# Patient Record
Sex: MALE | Race: WHITE | NOT HISPANIC OR LATINO | Employment: OTHER | URBAN - NONMETROPOLITAN AREA
[De-identification: names, ages, dates, MRNs, and addresses within clinical notes are randomized per-mention and may not be internally consistent; named-entity substitution may affect disease eponyms.]

---

## 2023-09-17 PROBLEM — E11.9 TYPE 2 DIABETES MELLITUS WITHOUT COMPLICATION (MULTI): Status: ACTIVE | Noted: 2023-09-17

## 2023-09-17 PROBLEM — I10 HIGH BLOOD PRESSURE: Status: ACTIVE | Noted: 2023-09-17

## 2023-09-17 PROBLEM — J45.909 ASTHMA (HHS-HCC): Status: ACTIVE | Noted: 2023-09-17

## 2023-09-17 RX ORDER — METFORMIN HYDROCHLORIDE 500 MG/1
500 TABLET ORAL
COMMUNITY
End: 2024-01-08

## 2023-09-17 RX ORDER — THEOPHYLLINE 400 MG/1
400 TABLET, EXTENDED RELEASE ORAL DAILY
COMMUNITY

## 2023-09-17 RX ORDER — BUDESONIDE AND FORMOTEROL FUMARATE DIHYDRATE 80; 4.5 UG/1; UG/1
2 AEROSOL RESPIRATORY (INHALATION)
COMMUNITY
End: 2023-11-09 | Stop reason: SDUPTHER

## 2023-09-17 RX ORDER — ALBUTEROL SULFATE 90 UG/1
2 AEROSOL, METERED RESPIRATORY (INHALATION) EVERY 4 HOURS PRN
COMMUNITY
End: 2023-11-09 | Stop reason: SDUPTHER

## 2023-09-17 RX ORDER — BLOOD SUGAR DIAGNOSTIC
STRIP MISCELLANEOUS DAILY
COMMUNITY
End: 2023-11-09 | Stop reason: SDUPTHER

## 2023-09-17 RX ORDER — MAGNESIUM 200 MG
2 TABLET ORAL DAILY
COMMUNITY

## 2023-10-09 ENCOUNTER — OFFICE VISIT (OUTPATIENT)
Dept: PRIMARY CARE | Facility: CLINIC | Age: 73
End: 2023-10-09
Payer: MEDICARE

## 2023-10-09 VITALS
WEIGHT: 193.6 LBS | DIASTOLIC BLOOD PRESSURE: 70 MMHG | BODY MASS INDEX: 29.34 KG/M2 | HEART RATE: 89 BPM | SYSTOLIC BLOOD PRESSURE: 128 MMHG | HEIGHT: 68 IN

## 2023-10-09 DIAGNOSIS — J45.20 MILD INTERMITTENT ASTHMA WITHOUT COMPLICATION (HHS-HCC): ICD-10-CM

## 2023-10-09 DIAGNOSIS — I10 PRIMARY HYPERTENSION: Primary | ICD-10-CM

## 2023-10-09 DIAGNOSIS — Z12.11 COLON CANCER SCREENING: ICD-10-CM

## 2023-10-09 DIAGNOSIS — E11.9 TYPE 2 DIABETES MELLITUS TREATED WITHOUT INSULIN (MULTI): ICD-10-CM

## 2023-10-09 DIAGNOSIS — R06.02 SHORTNESS OF BREATH: ICD-10-CM

## 2023-10-09 PROCEDURE — 3074F SYST BP LT 130 MM HG: CPT | Performed by: FAMILY MEDICINE

## 2023-10-09 PROCEDURE — 1159F MED LIST DOCD IN RCRD: CPT | Performed by: FAMILY MEDICINE

## 2023-10-09 PROCEDURE — 1160F RVW MEDS BY RX/DR IN RCRD: CPT | Performed by: FAMILY MEDICINE

## 2023-10-09 PROCEDURE — 99214 OFFICE O/P EST MOD 30 MIN: CPT | Performed by: FAMILY MEDICINE

## 2023-10-09 PROCEDURE — 3078F DIAST BP <80 MM HG: CPT | Performed by: FAMILY MEDICINE

## 2023-10-09 PROCEDURE — 1126F AMNT PAIN NOTED NONE PRSNT: CPT | Performed by: FAMILY MEDICINE

## 2023-10-09 PROCEDURE — 1036F TOBACCO NON-USER: CPT | Performed by: FAMILY MEDICINE

## 2023-10-09 ASSESSMENT — ENCOUNTER SYMPTOMS
RECTAL PAIN: 0
DIARRHEA: 0
BLOOD IN STOOL: 0
PALPITATIONS: 0
FATIGUE: 0
CONSTIPATION: 0
ACTIVITY CHANGE: 0
TREMORS: 0
WEAKNESS: 0
SHORTNESS OF BREATH: 1
DYSPHORIC MOOD: 1
DIZZINESS: 0
APPETITE CHANGE: 0
CHEST TIGHTNESS: 0

## 2023-10-09 ASSESSMENT — PAIN SCALES - GENERAL: PAINLEVEL: 0-NO PAIN

## 2023-10-09 NOTE — PATIENT INSTRUCTIONS
For the diabetes, will have Karen, our pharmacist, set you up with a freestyle azalia monitor for the next two weeks. That will allow you to see how your blood sugar reacts to food. WE can download that in two weeks and see how you do.    For the shortness of breath, this may be asthma. Use the albuterol before your walking and let me know how you respond. If that resolves it, we can increase the symbicort dose. In the meantime, do the calcium scoring test.     BP is well controlled.   Since you are low risk for colon cancer, you may do the cologuard test.

## 2023-10-09 NOTE — PROGRESS NOTES
"Subjective   Patient ID: MARK M Dressler is a 73 y.o. male who presents for Diabetes (A1C Check).    Duglasmanuela today for check on his diabetes.  I reviewed his hemoglobin A1c with him.  He surprised that is at high because he has been checking his blood sugar and his does not think its been that high.  He does not have any polyuria or polydipsia.    When asked about his asthma he states pretty well controlled but he does get short of breath when he is walking longer distances.  He does not report any wheezing.  He does use the Symbicort twice a day.  He is never try to use the albuterol for the shortness of breath.    He states he did do the Cologuard test last year.  He does not have a family history of colon cancer.    Blood pressure reviewed and is good today.         Review of Systems   Constitutional:  Negative for activity change, appetite change and fatigue.   Respiratory:  Positive for shortness of breath (See HPI). Negative for chest tightness.    Cardiovascular:  Negative for chest pain, palpitations and leg swelling.   Gastrointestinal:  Negative for blood in stool, constipation, diarrhea and rectal pain.   Neurological:  Negative for dizziness, tremors and weakness.   Psychiatric/Behavioral:  Positive for dysphoric mood (States for little while he just felt a bit off and thought maybe he was a bit depressed.  However, he states he has come out of this now.).        Objective   /70   Pulse 89   Ht 1.734 m (5' 8.25\")   Wt 87.8 kg (193 lb 9.6 oz)   BMI 29.22 kg/m²     Physical Exam  Constitutional:       Appearance: Normal appearance.   Neck:      Thyroid: No thyromegaly or thyroid tenderness.      Vascular: No carotid bruit.   Cardiovascular:      Rate and Rhythm: Normal rate and regular rhythm.      Heart sounds: No murmur heard.  Pulmonary:      Effort: Pulmonary effort is normal.      Breath sounds: Normal breath sounds.   Musculoskeletal:      Cervical back: Neck supple.   Neurological:      " Mental Status: He is alert.   Psychiatric:         Mood and Affect: Mood normal.         Assessment/Plan   For the diabetes, will have Karen, our pharmacist, set you up with a freestyle azalia monitor for the next two weeks. That will allow you to see how your blood sugar reacts to food. WE can download that in two weeks and see how you do.    For the shortness of breath, this may be asthma. Use the albuterol before your walking and let me know how you respond. If that resolves it, we can increase the symbicort dose. In the meantime, do the calcium scoring test.     BP is well controlled.   Since you are low risk for colon cancer, you may do the cologuard test.     Diagnoses and all orders for this visit:  Primary hypertension  Type 2 diabetes mellitus treated without insulin (CMS/[facility])  -     POCT GLYCOSYLATED HEMOGLOBIN (HGB A1C)  Mild intermittent asthma without complication

## 2023-10-23 ENCOUNTER — CLINICAL SUPPORT (OUTPATIENT)
Dept: PRIMARY CARE | Facility: CLINIC | Age: 73
End: 2023-10-23
Payer: MEDICARE

## 2023-10-23 DIAGNOSIS — E11.9 TYPE 2 DIABETES MELLITUS WITHOUT COMPLICATION, WITHOUT LONG-TERM CURRENT USE OF INSULIN (MULTI): Primary | ICD-10-CM

## 2023-10-23 NOTE — PROGRESS NOTES
Subjective   Patient ID: MARK M Dressler is a 73 y.o. male who presents for 2 week CGM review.    Referring Provider: Rc Fu MD     HPI  Pt has been wearing Jenna 3 CGM sensor x 2 weeks - presents for download of Jenna data. Pt currently only taking metformin 500mg daily with lunch - would ideally like to stop medication completely and manage diabetes with diet and lifestyle modifications. Reports he has been changing his diet since starting Jenna 3, now holding himself more accountable since his last visit where his A1c was elevated at 8.1%. Pt also reports he needs an A1c of 7% or lower in order to continue donating plasma.   14 days of Jenna 3 data - 83% in target, 17% above, avg glucose 151, patterns: low to mid 100s on waking, spike noted after breakfast/lunch meal.     Objective     Labs  Lab Results   Component Value Date    BILITOT 0.6 03/06/2023    CALCIUM 9.7 03/06/2023    CO2 26 03/06/2023     03/06/2023    CREATININE 0.9 03/06/2023    GLUCOSE 167 (H) 03/06/2023    ALKPHOS 63 03/06/2023    K 4.3 03/06/2023    PROT 6.3 03/06/2023     03/06/2023    AST 20 03/06/2023    ALT 22 03/06/2023    BUN 11 03/06/2023    ANIONGAP 13 03/06/2023    ALBUMIN 3.9 03/06/2023     Lab Results   Component Value Date    TRIG 241 (H) 03/06/2023    CHOL 191 03/06/2023    LDLCALC 101 03/06/2023    HDL 42 03/06/2023       Current Outpatient Medications on File Prior to Visit   Medication Sig Dispense Refill    albuterol 90 mcg/actuation inhaler Inhale 2 puffs every 4 hours if needed.      budesonide-formoteroL (Symbicort) 80-4.5 mcg/actuation inhaler Inhale 2 puffs 2 times a day.      magnesium 200 mg tablet Take 2 tablets (400 mg) by mouth once daily. with a meal      metFORMIN (Glucophage) 500 mg tablet Take 1 tablet (500 mg) by mouth once daily at noon. Take with meals.      OneTouch Ultra Test strip once daily. TEST      theophylline ER (Uniphyl) 400 mg 24 hr tablet Take 1 tablet (400 mg) by mouth once  daily.      ZINC ACETATE ORAL Take 1 capsule by mouth once daily.      [DISCONTINUED] ascorbic acid (VITAMIN C ORAL) Take by mouth.       No current facility-administered medications on file prior to visit.      No Known Allergies    Assessment/Plan   Problem List Items Addressed This Visit       Type 2 diabetes mellitus without complication (CMS/[facility]) - Primary   Continue using Jenna 3 CGM to monitor sugars daily and learn which foods cause higher sugars.   Continue to follow a carb controlled diet - incorporate more protein into meals to slow how quickly sugar is spiking and how high it is spiking.   Continue increasing activity levels - exercising at least 30 minutes per day.   Continue taking metformin 500mg daily.     Treatment and plan discussed with Dr. Fu. JESSIE Reyna, PharmD, BCACP, CDCES.

## 2023-10-24 ENCOUNTER — HOSPITAL ENCOUNTER (OUTPATIENT)
Dept: RADIOLOGY | Facility: HOSPITAL | Age: 73
Discharge: HOME | End: 2023-10-24
Payer: MEDICARE

## 2023-10-24 DIAGNOSIS — R06.02 SHORTNESS OF BREATH: ICD-10-CM

## 2023-10-24 PROCEDURE — 75571 CT HRT W/O DYE W/CA TEST: CPT | Mod: MG

## 2023-11-06 ENCOUNTER — CLINICAL SUPPORT (OUTPATIENT)
Dept: PRIMARY CARE | Facility: CLINIC | Age: 73
End: 2023-11-06
Payer: MEDICARE

## 2023-11-06 VITALS
HEART RATE: 76 BPM | HEIGHT: 68 IN | SYSTOLIC BLOOD PRESSURE: 144 MMHG | TEMPERATURE: 98.2 F | OXYGEN SATURATION: 95 % | DIASTOLIC BLOOD PRESSURE: 82 MMHG | BODY MASS INDEX: 29.4 KG/M2 | WEIGHT: 194 LBS

## 2023-11-06 DIAGNOSIS — E11.9 TYPE 2 DIABETES MELLITUS WITHOUT COMPLICATION, WITHOUT LONG-TERM CURRENT USE OF INSULIN (MULTI): Primary | ICD-10-CM

## 2023-11-06 DIAGNOSIS — I10 HYPERTENSION, UNSPECIFIED TYPE: ICD-10-CM

## 2023-11-06 DIAGNOSIS — J45.20 MILD INTERMITTENT ASTHMA WITHOUT COMPLICATION (HHS-HCC): ICD-10-CM

## 2023-11-06 LAB — POC HEMOGLOBIN A1C: 7 % (ref 4.2–6.5)

## 2023-11-06 PROCEDURE — 99213 OFFICE O/P EST LOW 20 MIN: CPT | Performed by: PHARMACIST

## 2023-11-06 PROCEDURE — 99213 OFFICE O/P EST LOW 20 MIN: CPT | Performed by: FAMILY MEDICINE

## 2023-11-06 PROCEDURE — 95251 CONT GLUC MNTR ANALYSIS I&R: CPT | Performed by: FAMILY MEDICINE

## 2023-11-06 RX ORDER — ERGOCALCIFEROL (VITAMIN D2) 200 MCG/ML
1 DROPS ORAL
COMMUNITY

## 2023-11-06 RX ORDER — ALBUTEROL SULFATE 90 UG/1
2 AEROSOL, METERED RESPIRATORY (INHALATION) EVERY 4 HOURS PRN
Qty: 54 G | Refills: 1 | Status: CANCELLED | OUTPATIENT
Start: 2023-11-06

## 2023-11-06 RX ORDER — BUDESONIDE AND FORMOTEROL FUMARATE DIHYDRATE 80; 4.5 UG/1; UG/1
2 AEROSOL RESPIRATORY (INHALATION)
Qty: 3 EACH | Refills: 1 | Status: CANCELLED | OUTPATIENT
Start: 2023-11-06

## 2023-11-06 RX ORDER — BLOOD SUGAR DIAGNOSTIC
1 STRIP MISCELLANEOUS DAILY
Qty: 100 STRIP | Refills: 1 | Status: CANCELLED | OUTPATIENT
Start: 2023-11-06

## 2023-11-06 ASSESSMENT — PAIN SCALES - GENERAL: PAINLEVEL: 0-NO PAIN

## 2023-11-06 ASSESSMENT — PATIENT HEALTH QUESTIONNAIRE - PHQ9
SUM OF ALL RESPONSES TO PHQ9 QUESTIONS 1 & 2: 0
1. LITTLE INTEREST OR PLEASURE IN DOING THINGS: NOT AT ALL
2. FEELING DOWN, DEPRESSED OR HOPELESS: NOT AT ALL

## 2023-11-06 NOTE — PATIENT INSTRUCTIONS
Your A1c was 7% today.     Continue taking metformin 500 mg once daily.     Continue to follow a more carb controlled diet as you have been this past month and staying active day to day.     Start checking your blood pressure at home a few times per week using a home blood pressure cuff.   - Keep a log of your readings and bring to your next visit.   - If your blood pressure readings are consistently over 140/80, call the office.     Restart the herbs for digestion as per your discussion with Dr. Fu.   - Take for about 6-8 weeks and see how you are feeling.     Follow up with Dr. Fu in 3 months.     Call the office with any questions or concerns if needed.

## 2023-11-06 NOTE — PROGRESS NOTES
Subjective   Patient ID: MARK M Dressler is a 73 y.o. male who presents for Diabetes.  HPI  Mr. Dressler presents today for follow up diabetes management and education. He has a PMH of diabetes type 2, asthma, and anxiety. He reports doing well overall since last visit.   Last A1c 8.1% - today 7.0%.   He has been monitoring his sugars 4+ timer per day with Jenna 3 sensor - 1 month of CGM data downloaded today. Pt is in target 85% of the time, above 15%, avg glucose 145. He continues to see a slight spike in glucose around meals, otherwise remains in target (low 100s) throughout the day.   He is following a carb controlled diet, still learning which foods can cause higher sugars. He is active, reports working out every day, either walking or going to the gym within an hour or two of his meals.   Currently taking metformin 500mg daily for diabetes.     BP is slightly elevated today - does not currently take any medication for HTN.   Pt does not take statin for lipids - prefers not to take medication. Monitoring lipids annually. Recent CT cardiac scoring - discussed with Dr. Fu in office today, total score of 70.93 considered low risk for coronary artery disease events.   Pt also discussed issues with digestion with Dr. Fu in office today - discussed resuming herbs previously ordered for digestion.     Current Outpatient Medications:     albuterol 90 mcg/actuation inhaler, Inhale 2 puffs every 4 hours if needed., Disp: , Rfl:     budesonide-formoteroL (Symbicort) 80-4.5 mcg/actuation inhaler, Inhale 2 puffs 2 times a day., Disp: , Rfl:     ergocalciferol (Vitamin D-2) 200 mcg/mL (8,000 unit/mL) drops, Take 1 capsule by mouth once daily., Disp: , Rfl:     magnesium 200 mg tablet, Take 2 tablets (400 mg) by mouth once daily. with a meal, Disp: , Rfl:     metFORMIN (Glucophage) 500 mg tablet, Take 1 tablet (500 mg) by mouth once daily at noon. Take with meals., Disp: , Rfl:     OneTouch Ultra Test strip, once  "daily. TEST, Disp: , Rfl:     theophylline ER (Uniphyl) 400 mg 24 hr tablet, Take 1 tablet (400 mg) by mouth once daily., Disp: , Rfl:     ZINC ACETATE ORAL, Take 1 capsule by mouth once daily., Disp: , Rfl:    Past Medical History:   Diagnosis Date    Anxiety     Asthma     Cataract     Diabetes mellitus (CMS/HCC)     Glaucoma    No Known Allergies    Review of Systems  See HPI.     Objective   Physical Exam  Vitals reviewed.     /82   Pulse 76   Temp 36.8 °C (98.2 °F)   Ht 1.727 m (5' 8\")   Wt 88 kg (194 lb)   SpO2 95%   BMI 29.50 kg/m²     Lab Results   Component Value Date    BILITOT 0.6 03/06/2023    CALCIUM 9.7 03/06/2023    CO2 26 03/06/2023     03/06/2023    CREATININE 0.9 03/06/2023    GLUCOSE 167 (H) 03/06/2023    ALKPHOS 63 03/06/2023    K 4.3 03/06/2023    PROT 6.3 03/06/2023     03/06/2023    AST 20 03/06/2023    ALT 22 03/06/2023    BUN 11 03/06/2023    ANIONGAP 13 03/06/2023    ALBUMIN 3.9 03/06/2023    EGFR 91 03/06/2023     Lab Results   Component Value Date    TRIG 241 (H) 03/06/2023    CHOL 191 03/06/2023    LDLCALC 101 03/06/2023    HDL 42 03/06/2023     Lab Results   Component Value Date    HGBA1C 7.0 (A) 11/06/2023      Assessment/Plan   Problem List Items Addressed This Visit       Mild intermittent asthma without complication     Pt requesting refills of albuterol and Symbicort inhalers.   - 3 month supply of each to go to preferred pharmacy.          High blood pressure     Monitor blood pressure at home a few times per week.   Keep a log of home BP readings and bring to next visit.   Reassess BP control at next visit in 3 months.          Type 2 diabetes mellitus without complication (CMS/HCC) - Primary     A1c is 7% today - at goal, improved form last visit / 1 month.   Pt would like to eventually get off medication completely for his diabetes - discussed importance of maintaining low carb diet and increased activity levels long term.     Plan:   Continue metformin " 500mg daily.   Continue low carb diet and exercising at least 30 minutes per day.   Continue Jenna 3 sensor for another two weeks.   - Karen, LenaD, to download remotely in two weeks.   Follow up with Dr. Fu in 3 months to check A1c again.          Relevant Orders    POCT glycosylated hemoglobin (Hb A1C) manually resulted (Completed)     Treatment and plan discussed with Dr. Fu. Lena PalaciosD, BCACP, [facility].     I, Dr. Fu, have reviewed this progress note, medication list, vital signs, any pertinent lab values, and any CGM data if present with the certified diabetes . This note reflects the treatment plan that was made with my input on the data that was presented above. I saw the patient face to face while reviewing the above data and formulating the treatment plan with the certified diabetes . Dr. Rc Fu

## 2023-11-06 NOTE — ASSESSMENT & PLAN NOTE
A1c is 7% today - at goal, improved form last visit / 1 month.   Pt would like to eventually get off medication completely for his diabetes - discussed importance of maintaining low carb diet and increased activity levels long term.     Plan:   Continue metformin 500mg daily.   Continue low carb diet and exercising at least 30 minutes per day.   Continue Jenna 3 sensor for another two weeks.   - Karen, PharmD, to download remotely in two weeks.   Follow up with Dr. Fu in 3 months to check A1c again.

## 2023-11-06 NOTE — ASSESSMENT & PLAN NOTE
Monitor blood pressure at home a few times per week.   Keep a log of home BP readings and bring to next visit.   Reassess BP control at next visit in 3 months.

## 2023-11-06 NOTE — Clinical Note
This is my note for Mark Dressler from 11/6/23 - I had everything finished and I thought I sent it to you that same day, but obviously I did not.... sorry about that. You should be able to just sign the encounter now that I routed it to you.

## 2023-11-06 NOTE — ASSESSMENT & PLAN NOTE
Continue using Jenna 3 CGM to monitor sugars daily and learn which foods cause higher sugars.   Continue to follow a carb controlled diet - incorporate more protein into meals to slow how quickly sugar is spiking and how high it is spiking.   Continue increasing activity levels - exercising at least 30 minutes per day.   Continue taking metformin 500mg daily.

## 2023-11-06 NOTE — ASSESSMENT & PLAN NOTE
Pt requesting refills of albuterol and Symbicort inhalers.   - 3 month supply of each to go to preferred pharmacy.

## 2023-11-09 DIAGNOSIS — J45.20 MILD INTERMITTENT ASTHMA WITHOUT COMPLICATION (HHS-HCC): Primary | ICD-10-CM

## 2023-11-09 DIAGNOSIS — E11.9 TYPE 2 DIABETES MELLITUS WITHOUT COMPLICATION, WITHOUT LONG-TERM CURRENT USE OF INSULIN (MULTI): ICD-10-CM

## 2023-11-12 RX ORDER — BUDESONIDE AND FORMOTEROL FUMARATE DIHYDRATE 80; 4.5 UG/1; UG/1
2 AEROSOL RESPIRATORY (INHALATION)
Qty: 1 EACH | Refills: 0 | Status: SHIPPED | OUTPATIENT
Start: 2023-11-12 | End: 2023-12-27

## 2023-11-12 RX ORDER — ALBUTEROL SULFATE 90 UG/1
2 AEROSOL, METERED RESPIRATORY (INHALATION) EVERY 4 HOURS PRN
Qty: 18 G | Refills: 1 | Status: SHIPPED | OUTPATIENT
Start: 2023-11-12

## 2023-11-12 RX ORDER — BLOOD SUGAR DIAGNOSTIC
1 STRIP MISCELLANEOUS DAILY
Qty: 100 STRIP | Refills: 2 | Status: SHIPPED | OUTPATIENT
Start: 2023-11-12 | End: 2024-02-10

## 2023-12-26 DIAGNOSIS — J45.20 MILD INTERMITTENT ASTHMA WITHOUT COMPLICATION (HHS-HCC): ICD-10-CM

## 2023-12-27 RX ORDER — BUDESONIDE AND FORMOTEROL FUMARATE DIHYDRATE 80; 4.5 UG/1; UG/1
2 AEROSOL RESPIRATORY (INHALATION) 2 TIMES DAILY
Qty: 10.2 G | Refills: 1 | Status: SHIPPED | OUTPATIENT
Start: 2023-12-27

## 2024-01-08 DIAGNOSIS — E11.9 TYPE 2 DIABETES MELLITUS WITHOUT COMPLICATION, WITHOUT LONG-TERM CURRENT USE OF INSULIN (MULTI): Primary | ICD-10-CM

## 2024-01-08 RX ORDER — METFORMIN HYDROCHLORIDE 500 MG/1
TABLET ORAL
Qty: 180 TABLET | Refills: 0 | Status: SHIPPED | OUTPATIENT
Start: 2024-01-08

## 2024-02-05 ENCOUNTER — APPOINTMENT (OUTPATIENT)
Dept: PRIMARY CARE | Facility: CLINIC | Age: 74
End: 2024-02-05
Payer: MEDICARE

## 2024-03-06 ENCOUNTER — APPOINTMENT (OUTPATIENT)
Dept: PRIMARY CARE | Facility: CLINIC | Age: 74
End: 2024-03-06
Payer: MEDICARE

## 2024-07-02 ENCOUNTER — LAB (OUTPATIENT)
Dept: LAB | Facility: LAB | Age: 74
End: 2024-07-02
Payer: MEDICARE

## 2024-07-02 ENCOUNTER — TELEPHONE (OUTPATIENT)
Dept: PRIMARY CARE | Facility: CLINIC | Age: 74
End: 2024-07-02

## 2024-07-02 ENCOUNTER — OFFICE VISIT (OUTPATIENT)
Dept: PRIMARY CARE | Facility: CLINIC | Age: 74
End: 2024-07-02
Payer: MEDICARE

## 2024-07-02 VITALS
WEIGHT: 193 LBS | SYSTOLIC BLOOD PRESSURE: 122 MMHG | HEART RATE: 84 BPM | BODY MASS INDEX: 29.35 KG/M2 | DIASTOLIC BLOOD PRESSURE: 68 MMHG

## 2024-07-02 DIAGNOSIS — Z12.5 PROSTATE CANCER SCREENING: ICD-10-CM

## 2024-07-02 DIAGNOSIS — I10 PRIMARY HYPERTENSION: ICD-10-CM

## 2024-07-02 DIAGNOSIS — J20.9 ACUTE BRONCHITIS, UNSPECIFIED ORGANISM: ICD-10-CM

## 2024-07-02 DIAGNOSIS — E11.9 TYPE 2 DIABETES MELLITUS WITHOUT COMPLICATION, WITHOUT LONG-TERM CURRENT USE OF INSULIN (MULTI): Primary | ICD-10-CM

## 2024-07-02 DIAGNOSIS — E11.9 TYPE 2 DIABETES MELLITUS WITHOUT COMPLICATION, WITHOUT LONG-TERM CURRENT USE OF INSULIN (MULTI): ICD-10-CM

## 2024-07-02 DIAGNOSIS — J45.20 MILD INTERMITTENT ASTHMA WITHOUT COMPLICATION (HHS-HCC): ICD-10-CM

## 2024-07-02 LAB
ALBUMIN SERPL BCP-MCNC: 3.7 G/DL (ref 3.4–5)
ALP SERPL-CCNC: 65 U/L (ref 33–136)
ALT SERPL W P-5'-P-CCNC: 17 U/L (ref 10–52)
ANION GAP SERPL CALC-SCNC: 12 MMOL/L (ref 10–20)
AST SERPL W P-5'-P-CCNC: 17 U/L (ref 9–39)
BILIRUB SERPL-MCNC: 1 MG/DL (ref 0–1.2)
BUN SERPL-MCNC: 7 MG/DL (ref 6–23)
CALCIUM SERPL-MCNC: 9.3 MG/DL (ref 8.6–10.3)
CHLORIDE SERPL-SCNC: 103 MMOL/L (ref 98–107)
CHOLEST SERPL-MCNC: 187 MG/DL (ref 0–199)
CHOLESTEROL/HDL RATIO: 4.5
CO2 SERPL-SCNC: 27 MMOL/L (ref 21–32)
CREAT SERPL-MCNC: 0.79 MG/DL (ref 0.5–1.3)
CREAT UR-MCNC: 33.1 MG/DL (ref 20–370)
EGFRCR SERPLBLD CKD-EPI 2021: >90 ML/MIN/1.73M*2
GLUCOSE SERPL-MCNC: 153 MG/DL (ref 74–99)
HDLC SERPL-MCNC: 41.1 MG/DL
LDLC SERPL CALC-MCNC: 107 MG/DL
MICROALBUMIN UR-MCNC: <7 MG/L
MICROALBUMIN/CREAT UR: NORMAL MG/G{CREAT}
NON HDL CHOLESTEROL: 146 MG/DL (ref 0–149)
POC HEMOGLOBIN A1C: 6.9 % (ref 4.2–6.5)
POTASSIUM SERPL-SCNC: 4.2 MMOL/L (ref 3.5–5.3)
PROT SERPL-MCNC: 6 G/DL (ref 6.4–8.2)
PSA SERPL-MCNC: 2.56 NG/ML
SODIUM SERPL-SCNC: 138 MMOL/L (ref 136–145)
TRIGL SERPL-MCNC: 197 MG/DL (ref 0–149)
VLDL: 39 MG/DL (ref 0–40)

## 2024-07-02 PROCEDURE — 3049F LDL-C 100-129 MG/DL: CPT | Performed by: FAMILY MEDICINE

## 2024-07-02 PROCEDURE — 1159F MED LIST DOCD IN RCRD: CPT | Performed by: FAMILY MEDICINE

## 2024-07-02 PROCEDURE — 99214 OFFICE O/P EST MOD 30 MIN: CPT | Performed by: FAMILY MEDICINE

## 2024-07-02 PROCEDURE — 82043 UR ALBUMIN QUANTITATIVE: CPT

## 2024-07-02 PROCEDURE — 82570 ASSAY OF URINE CREATININE: CPT

## 2024-07-02 PROCEDURE — 1124F ACP DISCUSS-NO DSCNMKR DOCD: CPT | Performed by: FAMILY MEDICINE

## 2024-07-02 PROCEDURE — 3078F DIAST BP <80 MM HG: CPT | Performed by: FAMILY MEDICINE

## 2024-07-02 PROCEDURE — 36415 COLL VENOUS BLD VENIPUNCTURE: CPT

## 2024-07-02 PROCEDURE — 1125F AMNT PAIN NOTED PAIN PRSNT: CPT | Performed by: FAMILY MEDICINE

## 2024-07-02 PROCEDURE — 83036 HEMOGLOBIN GLYCOSYLATED A1C: CPT | Performed by: FAMILY MEDICINE

## 2024-07-02 PROCEDURE — 1036F TOBACCO NON-USER: CPT | Performed by: FAMILY MEDICINE

## 2024-07-02 PROCEDURE — G0103 PSA SCREENING: HCPCS

## 2024-07-02 PROCEDURE — 3074F SYST BP LT 130 MM HG: CPT | Performed by: FAMILY MEDICINE

## 2024-07-02 RX ORDER — BUDESONIDE AND FORMOTEROL FUMARATE DIHYDRATE 80; 4.5 UG/1; UG/1
2 AEROSOL RESPIRATORY (INHALATION) 2 TIMES DAILY
Qty: 3 G | Refills: 1 | Status: SHIPPED | OUTPATIENT
Start: 2024-07-02 | End: 2024-07-02

## 2024-07-02 RX ORDER — METFORMIN HYDROCHLORIDE 500 MG/1
TABLET ORAL
Qty: 180 TABLET | Refills: 1 | Status: SHIPPED | OUTPATIENT
Start: 2024-07-02

## 2024-07-02 RX ORDER — ALBUTEROL SULFATE 90 UG/1
2 AEROSOL, METERED RESPIRATORY (INHALATION) EVERY 4 HOURS PRN
Qty: 18 G | Refills: 2 | Status: SHIPPED | OUTPATIENT
Start: 2024-07-02

## 2024-07-02 RX ORDER — AZITHROMYCIN 250 MG/1
TABLET, FILM COATED ORAL DAILY
Qty: 6 TABLET | Refills: 0 | Status: SHIPPED | OUTPATIENT
Start: 2024-07-02 | End: 2024-07-06

## 2024-07-02 RX ORDER — BUDESONIDE AND FORMOTEROL FUMARATE DIHYDRATE 80; 4.5 UG/1; UG/1
2 AEROSOL RESPIRATORY (INHALATION) 2 TIMES DAILY
Qty: 10.2 G | Refills: 3 | Status: SHIPPED | OUTPATIENT
Start: 2024-07-02 | End: 2024-09-30

## 2024-07-02 ASSESSMENT — ENCOUNTER SYMPTOMS
DYSPHORIC MOOD: 0
COUGH: 1
PALPITATIONS: 0
DIZZINESS: 0
SHORTNESS OF BREATH: 0
HEADACHES: 0
ACTIVITY CHANGE: 0
CHEST TIGHTNESS: 0

## 2024-07-02 ASSESSMENT — PATIENT HEALTH QUESTIONNAIRE - PHQ9
SUM OF ALL RESPONSES TO PHQ9 QUESTIONS 1 AND 2: 0
2. FEELING DOWN, DEPRESSED OR HOPELESS: NOT AT ALL
1. LITTLE INTEREST OR PLEASURE IN DOING THINGS: NOT AT ALL

## 2024-07-02 ASSESSMENT — PAIN SCALES - GENERAL: PAINLEVEL: 4

## 2024-07-02 NOTE — PATIENT INSTRUCTIONS
Will use the Zpack and let me know how you are doing in a week. This should clear things up, but if not, will consider treating with an herb for mucous/dampness.     Continue other meds.     Let me know if shortness of breath is not improving.     Check your lab tests fasting.     Follow up in 6 months.

## 2024-07-02 NOTE — PROGRESS NOTES
Subjective   Patient ID: Mark M Dressler is a 74 y.o. male who presents for Diabetes, Asthma, and URI.    HPI   He presents today for regular follow-up.  Is been watching his diet closely.  He is now on the eat for your blood type diet and feels he is really made some good progress with that.  Hemoglobin A1c is 6.9.    Last 3 weeks or so he is just had more mucus production.  He states it is a bit from his nose but also from his lungs.  He is coughing up mucus.  No fever or chills.  Just cannot seem to shake the mucus.  Prior to that, has had asthma under good control.    He is due for lab tests.    Review of Systems   Constitutional:  Negative for activity change.   Respiratory:  Positive for cough (Some cough when he has the mucus.). Negative for chest tightness and shortness of breath.    Cardiovascular:  Negative for chest pain, palpitations and leg swelling.   Neurological:  Negative for dizziness and headaches.   Psychiatric/Behavioral:  Negative for dysphoric mood.        Objective   /68   Pulse 84   Wt 87.5 kg (193 lb)   BMI 29.35 kg/m²     Physical Exam  Vitals reviewed.   Constitutional:       Appearance: He is not toxic-appearing.   HENT:      Nose: Congestion present. No rhinorrhea.      Mouth/Throat:      Mouth: Mucous membranes are moist.      Pharynx: Oropharynx is clear.   Eyes:      Conjunctiva/sclera: Conjunctivae normal.   Neck:      Vascular: No carotid bruit.   Pulmonary:      Effort: Pulmonary effort is normal. No respiratory distress.      Breath sounds: No wheezing or rhonchi.   Musculoskeletal:      Cervical back: No rigidity.   Lymphadenopathy:      Cervical: No cervical adenopathy.   Skin:     General: Skin is warm and dry.   Neurological:      Mental Status: He is alert.   Psychiatric:         Mood and Affect: Mood normal.         Assessment/Plan   Diagnoses and all orders for this visit:  Type 2 diabetes mellitus without complication, without long-term current use of insulin  (Multi)  -     POCT glycosylated hemoglobin (Hb A1C) manually resulted  -     metFORMIN (Glucophage) 500 mg tablet; TAKE 2 TABLETS BY MOUTH EVERY DAY WITH LUNCH  -     Comprehensive Metabolic Panel; Future  -     Lipid Panel; Future  -     Albumin-Creatinine Ratio, Urine Random; Future  Mild intermittent asthma without complication (HHS-HCC)  -     albuterol 90 mcg/actuation inhaler; Inhale 2 puffs every 4 hours if needed for wheezing.  -     budesonide-formoteroL (Symbicort) 80-4.5 mcg/actuation inhaler; Inhale 2 puffs 2 times a day.  Primary hypertension  Prostate cancer screening  -     Prostate Specific Antigen; Future  Acute bronchitis, unspecified organism  -     azithromycin (Zithromax) 250 mg tablet; Take 2 tablets (500 mg) by mouth once daily for 1 day, THEN 1 tablet (250 mg) once daily for 4 days. Take 2 tabs (500 mg) by mouth today, than 1 daily for 4 days..  We use the Zithromax and he will let me know if his symptoms of the mucus are not clearing over the next week.  If not, consider adding stomach herbs such as master cleanse to help processing mucus better.    He will do his labs fasting today.    Asthma diabetes under good control.  Continue his current medications.

## 2025-01-02 DIAGNOSIS — J45.20 MILD INTERMITTENT ASTHMA WITHOUT COMPLICATION (HHS-HCC): ICD-10-CM

## 2025-01-02 RX ORDER — DILTIAZEM HYDROCHLORIDE 60 MG/1
2 TABLET, FILM COATED ORAL 2 TIMES DAILY
Qty: 10.2 G | Refills: 1 | Status: SHIPPED | OUTPATIENT
Start: 2025-01-02

## 2025-03-14 DIAGNOSIS — J45.20 MILD INTERMITTENT ASTHMA WITHOUT COMPLICATION (HHS-HCC): ICD-10-CM

## 2025-03-20 RX ORDER — ALBUTEROL SULFATE 90 UG/1
2 AEROSOL, METERED RESPIRATORY (INHALATION) EVERY 4 HOURS PRN
Qty: 18 G | Refills: 2 | Status: SHIPPED | OUTPATIENT
Start: 2025-03-20 | End: 2025-03-21 | Stop reason: SDUPTHER

## 2025-03-20 RX ORDER — DILTIAZEM HYDROCHLORIDE 60 MG/1
2 TABLET, FILM COATED ORAL 2 TIMES DAILY
Qty: 10.2 G | Refills: 1 | Status: SHIPPED | OUTPATIENT
Start: 2025-03-20 | End: 2025-03-21 | Stop reason: SDUPTHER

## 2025-03-20 NOTE — TELEPHONE ENCOUNTER
Patient following up on refill request, patient has appointment on 03/26/2025, patient is out of inhaler

## 2025-03-21 DIAGNOSIS — J45.20 MILD INTERMITTENT ASTHMA WITHOUT COMPLICATION (HHS-HCC): ICD-10-CM

## 2025-03-21 RX ORDER — ALBUTEROL SULFATE 90 UG/1
2 INHALANT RESPIRATORY (INHALATION) EVERY 4 HOURS PRN
Qty: 18 G | Refills: 2 | Status: SHIPPED | OUTPATIENT
Start: 2025-03-21

## 2025-03-21 RX ORDER — BUDESONIDE AND FORMOTEROL FUMARATE DIHYDRATE 80; 4.5 UG/1; UG/1
2 AEROSOL RESPIRATORY (INHALATION) 2 TIMES DAILY
Qty: 10.2 G | Refills: 2 | Status: SHIPPED | OUTPATIENT
Start: 2025-03-21

## 2025-03-21 NOTE — PROGRESS NOTES
He is here this morning stating that [facility] says [facility] do not have the prescription that I sent in yesterday.  I printed the Symbicort and lbuterol so he can take it in.

## 2025-03-25 RX ORDER — BLOOD SUGAR DIAGNOSTIC
STRIP MISCELLANEOUS
COMMUNITY

## 2025-03-26 ENCOUNTER — OFFICE VISIT (OUTPATIENT)
Dept: PRIMARY CARE | Facility: CLINIC | Age: 75
End: 2025-03-26
Payer: MEDICARE

## 2025-03-26 VITALS
OXYGEN SATURATION: 97 % | WEIGHT: 200.4 LBS | SYSTOLIC BLOOD PRESSURE: 142 MMHG | DIASTOLIC BLOOD PRESSURE: 68 MMHG | BODY MASS INDEX: 30.47 KG/M2 | HEART RATE: 80 BPM

## 2025-03-26 DIAGNOSIS — Z12.11 ENCOUNTER FOR SCREENING FOR MALIGNANT NEOPLASM OF COLON: ICD-10-CM

## 2025-03-26 DIAGNOSIS — E11.9 TYPE 2 DIABETES MELLITUS WITHOUT COMPLICATION, WITHOUT LONG-TERM CURRENT USE OF INSULIN: Primary | ICD-10-CM

## 2025-03-26 DIAGNOSIS — I10 PRIMARY HYPERTENSION: ICD-10-CM

## 2025-03-26 DIAGNOSIS — J45.20 MILD INTERMITTENT ASTHMA WITHOUT COMPLICATION (HHS-HCC): ICD-10-CM

## 2025-03-26 DIAGNOSIS — Z12.5 SCREENING PSA (PROSTATE SPECIFIC ANTIGEN): ICD-10-CM

## 2025-03-26 LAB — POC HEMOGLOBIN A1C: 8.3 % (ref 4.2–6.5)

## 2025-03-26 PROCEDURE — 1036F TOBACCO NON-USER: CPT | Performed by: FAMILY MEDICINE

## 2025-03-26 PROCEDURE — 1124F ACP DISCUSS-NO DSCNMKR DOCD: CPT | Performed by: FAMILY MEDICINE

## 2025-03-26 PROCEDURE — 3078F DIAST BP <80 MM HG: CPT | Performed by: FAMILY MEDICINE

## 2025-03-26 PROCEDURE — 1159F MED LIST DOCD IN RCRD: CPT | Performed by: FAMILY MEDICINE

## 2025-03-26 PROCEDURE — 83036 HEMOGLOBIN GLYCOSYLATED A1C: CPT | Performed by: FAMILY MEDICINE

## 2025-03-26 PROCEDURE — 99214 OFFICE O/P EST MOD 30 MIN: CPT | Performed by: FAMILY MEDICINE

## 2025-03-26 PROCEDURE — 3077F SYST BP >= 140 MM HG: CPT | Performed by: FAMILY MEDICINE

## 2025-03-26 PROCEDURE — 1126F AMNT PAIN NOTED NONE PRSNT: CPT | Performed by: FAMILY MEDICINE

## 2025-03-26 ASSESSMENT — PATIENT HEALTH QUESTIONNAIRE - PHQ9
SUM OF ALL RESPONSES TO PHQ9 QUESTIONS 1 AND 2: 0
1. LITTLE INTEREST OR PLEASURE IN DOING THINGS: NOT AT ALL
2. FEELING DOWN, DEPRESSED OR HOPELESS: NOT AT ALL

## 2025-03-26 ASSESSMENT — ENCOUNTER SYMPTOMS
DIZZINESS: 0
ACTIVITY CHANGE: 0
CHEST TIGHTNESS: 0
PALPITATIONS: 0
COUGH: 0
HEADACHES: 0
SHORTNESS OF BREATH: 1

## 2025-03-26 ASSESSMENT — PAIN SCALES - GENERAL: PAINLEVEL_OUTOF10: 0-NO PAIN

## 2025-03-26 ASSESSMENT — COLUMBIA-SUICIDE SEVERITY RATING SCALE - C-SSRS
6. HAVE YOU EVER DONE ANYTHING, STARTED TO DO ANYTHING, OR PREPARED TO DO ANYTHING TO END YOUR LIFE?: NO
2. HAVE YOU ACTUALLY HAD ANY THOUGHTS OF KILLING YOURSELF?: NO
1. IN THE PAST MONTH, HAVE YOU WISHED YOU WERE DEAD OR WISHED YOU COULD GO TO SLEEP AND NOT WAKE UP?: NO

## 2025-03-26 NOTE — PATIENT INSTRUCTIONS
Check the BP at home and call if consistently > 140/90.     Get back to walking 30-40 minutes a day. Get back to your better diet.   Will recheck the blood tests in three months.     Do the cologuard test.

## 2025-03-26 NOTE — PROGRESS NOTES
Subjective   Patient ID: Mark M Dressler is a 75 y.o. male who presents for Diabetes and Asthma.    HPI   He is here for his diabetes checkup.  Hemoglobin A1c is 8.3.  He states that he had been doing well.  He states he is back now to his meditating once again and is going to correct his diet.  Notes warming get outside more exercise as well.  He feels he can get this back down.  Breathing was rough through the winter, but is better now.   His blood pressure has been good at his plasma donation visits, usually always under 140/90.    Review of Systems   Constitutional:  Negative for activity change.   Respiratory:  Positive for shortness of breath (Occasionally, related to the asthma which he states is improving). Negative for cough and chest tightness.    Cardiovascular:  Negative for chest pain, palpitations and leg swelling.   Neurological:  Negative for dizziness and headaches.         Objective   /68   Pulse 80   Wt 90.9 kg (200 lb 6.4 oz)   SpO2 97%   BMI 30.47 kg/m²     Physical Exam  Constitutional:       Appearance: Normal appearance.   Neck:      Thyroid: No thyromegaly or thyroid tenderness.      Vascular: No carotid bruit.   Cardiovascular:      Rate and Rhythm: Normal rate and regular rhythm.      Heart sounds: No murmur heard.  Pulmonary:      Effort: Pulmonary effort is normal.      Breath sounds: Normal breath sounds.   Musculoskeletal:      Cervical back: Neck supple.   Neurological:      Mental Status: He is alert.   Psychiatric:         Mood and Affect: Mood normal.         Assessment/Plan   Diagnoses and all orders for this visit:  Type 2 diabetes mellitus without complication, without long-term current use of insulin (Multi)  -     POCT glycosylated hemoglobin (Hb A1C) manually resulted  -     Comprehensive Metabolic Panel; Future  -     Lipid Panel; Future  -     Hemoglobin A1C; Future  Mild intermittent asthma without complication (HHS-HCC)  Primary hypertension  -     CBC;  Future  Encounter for screening for malignant neoplasm of colon  Screening PSA (prostate specific antigen)  -     Prostate Specific Antigen; Future  Overall doing well.  His asthma continues to improve now here in the spring.  He will let me know if that does get back to normal.  I like him to check his blood pressures at home and call if it is less than 140/90.  Sounds like it has been good as at the plasma donation visit is always in range.  He does have a Cologuard test at home that he is going to send that in.  He will get back to his regular eating and exercise routine and we will check all of his labs again in 3 months to make sure that his sugar gets back under control.  Sounds like with his medication he centering himself again and should be able to get this done.

## 2025-06-25 ENCOUNTER — OFFICE VISIT (OUTPATIENT)
Dept: PRIMARY CARE | Facility: CLINIC | Age: 75
End: 2025-06-25
Payer: MEDICARE

## 2025-06-25 VITALS
HEART RATE: 72 BPM | DIASTOLIC BLOOD PRESSURE: 68 MMHG | BODY MASS INDEX: 29.1 KG/M2 | SYSTOLIC BLOOD PRESSURE: 132 MMHG | WEIGHT: 191.4 LBS

## 2025-06-25 DIAGNOSIS — J45.20 MILD INTERMITTENT ASTHMA WITHOUT COMPLICATION (HHS-HCC): ICD-10-CM

## 2025-06-25 DIAGNOSIS — Z12.5 SCREENING PSA (PROSTATE SPECIFIC ANTIGEN): ICD-10-CM

## 2025-06-25 DIAGNOSIS — I10 PRIMARY HYPERTENSION: Primary | ICD-10-CM

## 2025-06-25 DIAGNOSIS — E11.9 TYPE 2 DIABETES MELLITUS WITHOUT COMPLICATION, WITHOUT LONG-TERM CURRENT USE OF INSULIN: ICD-10-CM

## 2025-06-25 LAB — POC HEMOGLOBIN A1C: 7.1 % (ref 4.2–6.5)

## 2025-06-25 PROCEDURE — 1159F MED LIST DOCD IN RCRD: CPT | Performed by: FAMILY MEDICINE

## 2025-06-25 PROCEDURE — 3078F DIAST BP <80 MM HG: CPT | Performed by: FAMILY MEDICINE

## 2025-06-25 PROCEDURE — 3075F SYST BP GE 130 - 139MM HG: CPT | Performed by: FAMILY MEDICINE

## 2025-06-25 PROCEDURE — 99214 OFFICE O/P EST MOD 30 MIN: CPT | Performed by: FAMILY MEDICINE

## 2025-06-25 PROCEDURE — 1126F AMNT PAIN NOTED NONE PRSNT: CPT | Performed by: FAMILY MEDICINE

## 2025-06-25 PROCEDURE — 3051F HG A1C>EQUAL 7.0%<8.0%: CPT | Performed by: FAMILY MEDICINE

## 2025-06-25 PROCEDURE — 83036 HEMOGLOBIN GLYCOSYLATED A1C: CPT | Performed by: FAMILY MEDICINE

## 2025-06-25 PROCEDURE — 1036F TOBACCO NON-USER: CPT | Performed by: FAMILY MEDICINE

## 2025-06-25 RX ORDER — METFORMIN HYDROCHLORIDE 500 MG/1
500 TABLET ORAL
Qty: 90 TABLET | Refills: 1 | Status: SHIPPED | OUTPATIENT
Start: 2025-06-25

## 2025-06-25 ASSESSMENT — ENCOUNTER SYMPTOMS
ACTIVITY CHANGE: 0
DIZZINESS: 0
PALPITATIONS: 0
SHORTNESS OF BREATH: 0
HEADACHES: 0
CHEST TIGHTNESS: 0
COUGH: 0

## 2025-06-25 ASSESSMENT — PATIENT HEALTH QUESTIONNAIRE - PHQ9
1. LITTLE INTEREST OR PLEASURE IN DOING THINGS: NOT AT ALL
2. FEELING DOWN, DEPRESSED OR HOPELESS: NOT AT ALL
SUM OF ALL RESPONSES TO PHQ9 QUESTIONS 1 AND 2: 0

## 2025-06-25 ASSESSMENT — PAIN SCALES - GENERAL: PAINLEVEL_OUTOF10: 0-NO PAIN

## 2025-06-25 NOTE — PROGRESS NOTES
Subjective   Patient ID: Mark M Dressler is a 75 y.o. male who presents for Diabetes and Hypertension.    HPI     He he presents today for regular follow-up.  Overall, states doing well.  I reviewed his hemoglobin A1c result with him.  Has been controlled although a little bit worse because of that little bit higher.  Usually has trouble Livia but has been using the albuterol few times per week now.  Otherwise feeling well.  He is exercising now regularly though he has gotten to the gym.    Review of Systems   Constitutional:  Negative for activity change.   Respiratory:  Negative for cough, chest tightness and shortness of breath.    Cardiovascular:  Negative for chest pain, palpitations and leg swelling.   Neurological:  Negative for dizziness and headaches.       Objective   /68   Pulse 72   Wt 86.8 kg (191 lb 6.4 oz)   BMI 29.10 kg/m²     Physical Exam  Constitutional:       Appearance: Normal appearance.   Neck:      Thyroid: No thyromegaly or thyroid tenderness.      Vascular: No carotid bruit.   Cardiovascular:      Rate and Rhythm: Normal rate and regular rhythm.      Heart sounds: No murmur heard.  Pulmonary:      Effort: Pulmonary effort is normal.      Breath sounds: Normal breath sounds.   Musculoskeletal:      Cervical back: Neck supple.   Neurological:      Mental Status: He is alert.   Psychiatric:         Mood and Affect: Mood normal.         Assessment/Plan   Diagnoses and all orders for this visit:  Primary hypertension  -     Comprehensive Metabolic Panel; Future  -     CBC; Future  Type 2 diabetes mellitus without complication, without long-term current use of insulin  -     POCT glycosylated hemoglobin (Hb A1C) manually resulted  -     Lipid Panel; Future  -     metFORMIN (Glucophage) 500 mg tablet; Take 1 tablet (500 mg) by mouth once daily with breakfast. Take one tablet each day with breakfast  Mild intermittent asthma without complication ([facility]-HCC)  Screening PSA (prostate specific  antigen)  -     Prostate Specific Antigen; Future  Doing very well.  He will continue the regular exercise.  Only important thing is to walk 30 to 40 minutes a day.  He can go to the gym as well if there is time.  We can recheck things again in 6 months.  He will check his lab tests next month at his convenience  Continue his current medications.  He is up-to-date with colon cancer screening having done the Cologuard through his insurance company.  I did review that result of normal and marked.

## 2025-06-26 DIAGNOSIS — Z12.5 SCREENING PSA (PROSTATE SPECIFIC ANTIGEN): ICD-10-CM

## 2025-06-26 DIAGNOSIS — E11.9 TYPE 2 DIABETES MELLITUS WITHOUT COMPLICATION, WITHOUT LONG-TERM CURRENT USE OF INSULIN: ICD-10-CM

## 2025-06-26 DIAGNOSIS — I10 PRIMARY HYPERTENSION: ICD-10-CM

## 2025-07-02 LAB
ALBUMIN SERPL-MCNC: 3.8 G/DL (ref 3.6–5.1)
ALP SERPL-CCNC: 48 U/L (ref 35–144)
ALT SERPL-CCNC: 17 U/L (ref 9–46)
ANION GAP SERPL CALCULATED.4IONS-SCNC: 8 MMOL/L (CALC) (ref 7–17)
AST SERPL-CCNC: 19 U/L (ref 10–35)
BILIRUB SERPL-MCNC: 0.8 MG/DL (ref 0.2–1.2)
BUN SERPL-MCNC: 9 MG/DL (ref 7–25)
CALCIUM SERPL-MCNC: 8.9 MG/DL (ref 8.6–10.3)
CHLORIDE SERPL-SCNC: 105 MMOL/L (ref 98–110)
CHOLEST SERPL-MCNC: 169 MG/DL
CHOLEST/HDLC SERPL: 4 (CALC)
CO2 SERPL-SCNC: 25 MMOL/L (ref 20–32)
CREAT SERPL-MCNC: 0.69 MG/DL (ref 0.7–1.28)
EGFRCR SERPLBLD CKD-EPI 2021: 98 ML/MIN/1.73M2
ERYTHROCYTE [DISTWIDTH] IN BLOOD BY AUTOMATED COUNT: 11.2 % (ref 11–15)
GLUCOSE SERPL-MCNC: 136 MG/DL (ref 65–99)
HCT VFR BLD AUTO: 47.4 % (ref 38.5–50)
HDLC SERPL-MCNC: 42 MG/DL
HGB BLD-MCNC: 15.5 G/DL (ref 13.2–17.1)
LDLC SERPL CALC-MCNC: 99 MG/DL (CALC)
MCH RBC QN AUTO: 33 PG (ref 27–33)
MCHC RBC AUTO-ENTMCNC: 32.7 G/DL (ref 32–36)
MCV RBC AUTO: 100.9 FL (ref 80–100)
NONHDLC SERPL-MCNC: 127 MG/DL (CALC)
PLATELET # BLD AUTO: 208 THOUSAND/UL (ref 140–400)
PMV BLD REES-ECKER: 11.1 FL (ref 7.5–12.5)
POTASSIUM SERPL-SCNC: 4.2 MMOL/L (ref 3.5–5.3)
PROT SERPL-MCNC: 5.8 G/DL (ref 6.1–8.1)
PSA SERPL-MCNC: 2.75 NG/ML
RBC # BLD AUTO: 4.7 MILLION/UL (ref 4.2–5.8)
SODIUM SERPL-SCNC: 138 MMOL/L (ref 135–146)
TRIGL SERPL-MCNC: 186 MG/DL
WBC # BLD AUTO: 5.7 THOUSAND/UL (ref 3.8–10.8)

## 2025-07-08 DIAGNOSIS — J45.20 MILD INTERMITTENT ASTHMA WITHOUT COMPLICATION (HHS-HCC): ICD-10-CM

## 2025-07-08 DIAGNOSIS — J45.909 UNCOMPLICATED ASTHMA, UNSPECIFIED ASTHMA SEVERITY, UNSPECIFIED WHETHER PERSISTENT (HHS-HCC): Primary | ICD-10-CM

## 2025-07-08 RX ORDER — BUDESONIDE AND FORMOTEROL FUMARATE DIHYDRATE 80; 4.5 UG/1; UG/1
2 AEROSOL RESPIRATORY (INHALATION) 2 TIMES DAILY
Qty: 10.2 G | Refills: 2 | Status: SHIPPED | OUTPATIENT
Start: 2025-07-08

## 2025-07-10 RX ORDER — DILTIAZEM HYDROCHLORIDE 60 MG/1
2 TABLET, FILM COATED ORAL 2 TIMES DAILY
Qty: 10.2 G | Refills: 1 | Status: SHIPPED | OUTPATIENT
Start: 2025-07-10

## 2025-07-31 ENCOUNTER — TELEPHONE (OUTPATIENT)
Dept: PRIMARY CARE | Facility: CLINIC | Age: 75
End: 2025-07-31
Payer: MEDICARE

## 2025-07-31 DIAGNOSIS — E11.9 TYPE 2 DIABETES MELLITUS WITHOUT COMPLICATION, WITHOUT LONG-TERM CURRENT USE OF INSULIN: ICD-10-CM

## 2025-07-31 NOTE — TELEPHONE ENCOUNTER
Patient  sent [employer] message and informed that urine albumin-creatine ratio has been order as a part of patients routine diabetic screening.     Labwork ordered per office protocol.

## 2026-01-05 ENCOUNTER — APPOINTMENT (OUTPATIENT)
Dept: PRIMARY CARE | Facility: CLINIC | Age: 76
End: 2026-01-05
Payer: MEDICARE